# Patient Record
Sex: FEMALE | Race: WHITE | NOT HISPANIC OR LATINO | Employment: OTHER | URBAN - NONMETROPOLITAN AREA
[De-identification: names, ages, dates, MRNs, and addresses within clinical notes are randomized per-mention and may not be internally consistent; named-entity substitution may affect disease eponyms.]

---

## 2024-07-15 ENCOUNTER — OFFICE VISIT (OUTPATIENT)
Dept: URGENT CARE | Facility: CLINIC | Age: 69
End: 2024-07-15
Payer: MEDICARE

## 2024-07-15 VITALS
DIASTOLIC BLOOD PRESSURE: 80 MMHG | HEIGHT: 65 IN | OXYGEN SATURATION: 97 % | RESPIRATION RATE: 16 BRPM | HEART RATE: 77 BPM | SYSTOLIC BLOOD PRESSURE: 123 MMHG | BODY MASS INDEX: 32.49 KG/M2 | TEMPERATURE: 97.9 F | WEIGHT: 195 LBS

## 2024-07-15 DIAGNOSIS — J20.9 ACUTE BRONCHITIS, UNSPECIFIED ORGANISM: Primary | ICD-10-CM

## 2024-07-15 PROCEDURE — 99213 OFFICE O/P EST LOW 20 MIN: CPT | Performed by: NURSE PRACTITIONER

## 2024-07-15 RX ORDER — PREDNISONE 20 MG/1
20 TABLET ORAL DAILY
Qty: 5 TABLET | Refills: 0 | Status: SHIPPED | OUTPATIENT
Start: 2024-07-15 | End: 2024-07-20

## 2024-07-15 RX ORDER — LEVOTHYROXINE SODIUM 75 UG/1
TABLET ORAL
COMMUNITY

## 2024-07-15 RX ORDER — DOXYCYCLINE 100 MG/1
100 TABLET ORAL 2 TIMES DAILY
Qty: 14 TABLET | Refills: 0 | Status: SHIPPED | OUTPATIENT
Start: 2024-07-15 | End: 2024-07-22

## 2024-07-15 RX ORDER — PAROXETINE 10 MG/1
1 TABLET, FILM COATED ORAL
COMMUNITY
Start: 2023-12-31

## 2024-07-15 RX ORDER — HYDROXYZINE PAMOATE 25 MG/1
25 CAPSULE ORAL 3 TIMES DAILY PRN
COMMUNITY
Start: 2024-03-11

## 2024-07-15 NOTE — PROGRESS NOTES
69 y.o. female presents for evaluation of URI.  Symptoms including dry non productive cough, congestion, body aches, malaise, and headache have been present for 3 days and refractory to OTC meds.  No fever, chills, nausea, vomiting, abdominal pain, CP, or SOB.  Has a history of bronchitis. No known COVID 19/flu exposure.      Vitals:    07/15/24 0958   BP: 123/80   Pulse: 77   Resp: 16   Temp: 36.6 °C (97.9 °F)   SpO2: 97%       Allergies   Allergen Reactions    Azithromycin Rash    Penicillins Rash     Medication Documentation Review Audit       Reviewed by GAMAL Saleem (Nurse Practitioner) on 07/15/24 at 1007      Medication Order Taking? Sig Documenting Provider Last Dose Status   doxycycline (Adoxa) 100 mg tablet [id number]  Take 1 tablet (100 mg) by mouth 2 times a day for 7 days. Take with a full glass of water and do not lie down for at least 30 minutes after GAMAL Saleem  Active   hydrOXYzine pamoate (Vistaril) 25 mg capsule [id number] Yes Take 1 capsule (25 mg) by mouth 3 times a day as needed for anxiety. Historical Provider, MD  Active   levothyroxine (Synthroid, Levoxyl) 75 mcg tablet [id number]  Take by mouth. Historical Provider, MD  Active   PARoxetine (Paxil) 10 mg tablet [id number] Yes Take 1 tablet (10 mg) by mouth early in the morning.. Historical Provider, MD  Active   predniSONE (Deltasone) 20 mg tablet [id number]  Take 1 tablet (20 mg) by mouth once daily for 5 days. GAMAL Saleem  Active                    No past medical history on file.    No past surgical history on file.    ROS  See HPI    Physical Exam  Vitals and nursing note reviewed.   Constitutional:       General: She is not in acute distress.     Appearance: Normal appearance. She is not ill-appearing or toxic-appearing.   HENT:      Head: Normocephalic and atraumatic.      Right Ear: Tympanic membrane and ear canal normal.      Left Ear: Tympanic membrane and ear canal normal.      Nose:  Congestion present.      Mouth/Throat:      Mouth: Mucous membranes are moist.      Pharynx: Oropharynx is clear.   Eyes:      Extraocular Movements: Extraocular movements intact.      Conjunctiva/sclera: Conjunctivae normal.      Pupils: Pupils are equal, round, and reactive to light.   Cardiovascular:      Rate and Rhythm: Normal rate.   Pulmonary:      Effort: Pulmonary effort is normal.      Breath sounds: Normal breath sounds.      Comments: Dry cough during exam  Lymphadenopathy:      Cervical: Cervical adenopathy present.   Skin:     General: Skin is warm and dry.   Neurological:      General: No focal deficit present.      Mental Status: She is alert and oriented to person, place, and time.   Psychiatric:         Mood and Affect: Mood normal.         Behavior: Behavior normal.           Assessment/Plan/MDM  Chio was seen today for uri.  Diagnoses and all orders for this visit:  Acute bronchitis, unspecified organism (Primary)  -     doxycycline (Adoxa) 100 mg tablet; Take 1 tablet (100 mg) by mouth 2 times a day for 7 days. Take with a full glass of water and do not lie down for at least 30 minutes after  -     predniSONE (Deltasone) 20 mg tablet; Take 1 tablet (20 mg) by mouth once daily for 5 days.    Encouraged pt to continue otc cold remedies PRN, push PO fluids and rest. Patient's clinical presentation is otherwise unremarkable at this time. Patient is discharged with instructions to follow-up with primary care or seek emergency medical attention for worsening symptoms or any new concerns.    I did personally review Chio's past medical history, surgical history, social history, as well as family history (when relevant).  In this case, I also oversaw the her drug management by reviewing her medication list, allergy list, as well as the medications that I prescribed during the UC course and/or recommended as an out-patient (including possible OTC medications such as acetaminophen, NSAIDs ,  etc).    After reviewing the items above, I did not look at previous medical documentation, such as recent hospitalizations, office visits, and/or recent consultations with PCP/specialist.                          SDOH:   Another factor that I considered in Chio's care was her Social Determinants of Health (SDOH). During this UC encounter, she did not have social determinants of health. Those SDOH influencing Chio's care are: none      Kenneth Ndiaye CNP  [facility]  [phone]

## 2024-07-24 ENCOUNTER — EVALUATION (OUTPATIENT)
Dept: PHYSICAL THERAPY | Facility: CLINIC | Age: 69
End: 2024-07-24
Payer: MEDICARE

## 2024-07-24 DIAGNOSIS — M25.551 PAIN IN RIGHT HIP: Primary | ICD-10-CM

## 2024-07-24 PROCEDURE — 97110 THERAPEUTIC EXERCISES: CPT | Mod: GP | Performed by: PHYSICAL THERAPIST

## 2024-07-24 PROCEDURE — 97161 PT EVAL LOW COMPLEX 20 MIN: CPT | Mod: GP | Performed by: PHYSICAL THERAPIST

## 2024-07-24 ASSESSMENT — PATIENT HEALTH QUESTIONNAIRE - PHQ9
2. FEELING DOWN, DEPRESSED OR HOPELESS: NOT AT ALL
SUM OF ALL RESPONSES TO PHQ9 QUESTIONS 1 AND 2: 0
1. LITTLE INTEREST OR PLEASURE IN DOING THINGS: NOT AT ALL

## 2024-07-24 ASSESSMENT — ENCOUNTER SYMPTOMS
OCCASIONAL FEELINGS OF UNSTEADINESS: 0
DEPRESSION: 0
LOSS OF SENSATION IN FEET: 0

## 2024-07-24 ASSESSMENT — PAIN - FUNCTIONAL ASSESSMENT: PAIN_FUNCTIONAL_ASSESSMENT: 0-10

## 2024-07-24 ASSESSMENT — PAIN SCALES - GENERAL: PAINLEVEL_OUTOF10: 5 - MODERATE PAIN

## 2024-07-24 NOTE — LETTER
July 24, 2024    Rickey Roper MD  [address]  [place] [address]    Patient: Chio Thompson   YOB: 1955   Date of Visit: 7/24/2024       Dear Rickey Roper MD  [address] [place] [address]    The attached plan of care is being sent to you because your patient’s medical reimbursement requires that you certify the plan of care. Your signature is required to allow uninterrupted insurance coverage.      You may indicate your approval by signing below and faxing this form back to us at Dept Fax: [phone].    Please call Dept: [phone] with any questions or concerns.    Thank you for this referral,        Carlos Levy, PT  [address] [address]    Payer: Payor: MEDICARE / Plan: MEDICARE PART A AND B / Product Type: *No Product type* /                                                                         Date:     Dear Carlos Levy, PT,     Re: Ms. Chio Thompson, MRN:[id number]    I certify that I have reviewed the attached plan of care and it is medically necessary for Ms. Chio Thompson (1955) who is under my care.          ______________________________________                    _________________  Provider name and credentials                                           Date and time                                                                                           Plan of Care 7/24/24   Effective from: 7/24/2024  Effective to: 8/26/2024    Plan ID: [id number]            Participants as of Finalize on 7/24/2024    Name Type Comments Contact Info    Rickey Roper MD PCP - General  [phone]    Carlos Levy, PT Physical Therapist  [phone]       Last Plan Note     Author: Carlos Levy PT Status: Signed Last edited: 7/24/2024 10:00 AM       Physical Therapy Evaluation and Treatment      Patient Name: Chio Thompson  MRN: [id number]  Today's Date: 7/24/2024  Time  Calculation  Start Time: 1005  Stop Time: 1045  Time Calculation (min): 40 min    PT Evaluation Time Entry  PT Evaluation (Low) Time Entry: 29   PT Therapeutic Procedures Time Entry  Therapeutic Exercise Time Entry: 10                         Assessment:  PT Assessment Results: Decreased strength, Decreased range of motion, Impaired balance, Decreased mobility, Pain  Rehab Prognosis: Good  Barriers to Participation:  (None)    The pt presents with Medical Dx of Pain in R Hip.   Pt presents with the following deficits: increased pain, decreased strength, ROM, flexibility, balance, gait and functional mobility.  Pt would benefit from PT services in order to improve on these deficits and maximize strength and ability for functional activity/mobility.        Plan:  Treatment/Interventions: Aquatic therapy, Cryotherapy, Dry needling, Education/ Instruction, Electrical stimulation, Hot pack, Manual therapy, Neuromuscular re-education, Therapeutic exercises (Manual Therapy including IASTM as needed.)  PT Plan: Skilled PT  PT Frequency:  (2x/wk for 4 weeks or 9 sessions)  Rehab Potential: Good  Plan of Care Agreement: Patient (Patient Goal:  Improve pain to get around better.)    Current Problem:   Problem List Items Addressed This Visit             ICD-10-CM       Musculoskeletal and Injuries    Pain in right hip - Primary M25.551    Relevant Orders    Follow Up In Physical Therapy    Follow Up In Physical Therapy       Subjective   Pt has hx of L THR 6 years ago and reports that she started to have R hip area pain with leg buckling occasionally approx 2 months ago.  Pt reports that her pain is worse with getting up from a chair and negotiating stairs.  Pt reports that sitting, rest, tylenol and heat or ice helps with the pain.      General  Reason for Referral: R hip issue  Referred By: Dr. Roper  General Comment: Visit # 1/9    Precautions:  Precautions  Precautions Comment: Low Fall Risk.  PMH: OA, hx of L THR 6  yrs ago, thyrod disorder.    Pain:  Pain Assessment  Pain Assessment: 0-10  0-10 (Numeric) Pain Score: 5 - Moderate pain (R hip pain range  5-8/10)  Pain Type: Chronic pain  Pain Location: Hip  Pain Orientation: Right    Home Living:   Pt lives alone in a 2 story home.  2 flts of stairs with rail to upstairs and down to laundry in basement.    Prior Level of Function:   Pt was I with all ADL's and IADL's prior.  Retired.        Objective     Gait:  Mildly antalgic gait with mildly slower sadiq.      Palpation:   Tender R hip greater trochanter.     Strength:                      R-   Hip flex   4/5   Hip abd/add   4/5    Hip ER   4/5   Hip IR   4/5   Knee flex   4+/5               Knee ext   4+/5     L-   Hip flex   5/5   Hip abd/add   5/5    Hip ER   5/5   Hip IR   5/5   Knee flex   5/5               Knee ext   5/5    Hip PROM:                    R-   Hip flex  105 deg    Hip abd  35 deg    Hip ER 20  deg   Hip IR 10  deg     L-   WFL Throughout    Flexibility:   Moderate R HS and piriformis tightness.                                                                 Special Tests:  N/A    Outcome Measures:  Other Measures  Lower Extremity Funtional Score (LEFS): 43     Treatments:  Ther Ex:  10 min  1 unit  Hooklying SKTC  5 sec hold x 5 reps ea  Hooklying Piriformis/Glut Stretch  20 sec hold x 2 ea  Hooklying Lateral Trunk Rotation  (small comfortable arc of motion)   2 min  HEP instruction with handout given    OP EDUCATION:   PT edu pt regarding PT POC/course of treatment and HEP.  Pt was given exercise handout as a reference.  Pt verbalized good understanding.  Access Code: [email or web address]    Goals:  Active       PT Problem       PT Goal 1       Start:  24    Expected End:  24       LT) Improve L hip strength from 4/5  to >= 5-/5 throughout in order to facilitate safe gait and mobility.       4-6 weeks      2) Improve L hip PROM from 10 deg IR  to >= 20 deg IR  in order to facilitate safe gait and  stair negotiation.       4-6 weeks        3) Improve L hip pain from   8/10 to <= 0-3/10 with activity in order to improve QOL.   4-6 weeks      4) Improve LEFS score by >= 5 points in order to improve QOL.    4-6 weeks      5) Pt will be able to walk longer distances, transition sit to stand from chair, negotiate stairs and return to exercise, or work around the home without significant limitation.    4-6 weeks      ST) Pt/caregiver will be I and consistent with HEP with use of handout as needed in order to maximize strength and flexibility.      2-3 weeks                            Current Participants as of 2024    Name Type Comments Contact Info    Rickey Roper MD PCP - General  [phone]    Signature pending    Carlos Levy, PT Physical Therapist  [phone]

## 2024-07-24 NOTE — PROGRESS NOTES
Physical Therapy Evaluation and Treatment      Patient Name: Chio Thompson  MRN: [id number]  Today's Date: 7/24/2024  Time Calculation  Start Time: 1005  Stop Time: 1045  Time Calculation (min): 40 min    PT Evaluation Time Entry  PT Evaluation (Low) Time Entry: 29   PT Therapeutic Procedures Time Entry  Therapeutic Exercise Time Entry: 10                         Assessment:  PT Assessment Results: Decreased strength, Decreased range of motion, Impaired balance, Decreased mobility, Pain  Rehab Prognosis: Good  Barriers to Participation:  (None)    The pt presents with Medical Dx of Pain in R Hip.   Pt presents with the following deficits: increased pain, decreased strength, ROM, flexibility, balance, gait and functional mobility.  Pt would benefit from PT services in order to improve on these deficits and maximize strength and ability for functional activity/mobility.        Plan:  Treatment/Interventions: Aquatic therapy, Cryotherapy, Dry needling, Education/ Instruction, Electrical stimulation, Hot pack, Manual therapy, Neuromuscular re-education, Therapeutic exercises (Manual Therapy including IASTM as needed.)  PT Plan: Skilled PT  PT Frequency:  (2x/wk for 4 weeks or 9 sessions)  Rehab Potential: Good  Plan of Care Agreement: Patient (Patient Goal:  Improve pain to get around better.)    Current Problem:   Problem List Items Addressed This Visit             ICD-10-CM       Musculoskeletal and Injuries    Pain in right hip - Primary M25.551    Relevant Orders    Follow Up In Physical Therapy    Follow Up In Physical Therapy       Subjective   Pt has hx of L THR 6 years ago and reports that she started to have R hip area pain with leg buckling occasionally approx 2 months ago.  Pt reports that her pain is worse with getting up from a chair and negotiating stairs.  Pt reports that sitting, rest, tylenol and heat or ice helps with the pain.      General  Reason for Referral: R hip issue  Referred By:   [facility]  General Comment: Visit #     Precautions:  Precautions  Precautions Comment: Low Fall Risk.  PMH: OA, hx of L THR 6 yrs ago, thyrod disorder.    Pain:  Pain Assessment  Pain Assessment: 0-10  0-10 (Numeric) Pain Score: 5 - Moderate pain (R hip pain range  5-8/10)  Pain Type: Chronic pain  Pain Location: Hip  Pain Orientation: Right    Home Living:   Pt lives alone in a 2 story home.  2 flts of stairs with rail to upstairs and down to laundry in basement.    Prior Level of Function:   Pt was I with all ADL's and IADL's prior.  Retired.        Objective     Gait:  Mildly antalgic gait with mildly slower sadiq.      Palpation:   Tender R hip greater trochanter.     Strength:                      R-   Hip flex   4/5   Hip abd/add   4/5    Hip ER   4/5   Hip IR   4/5   Knee flex   4+/5               Knee ext   4+/5     L-   Hip flex   5/5   Hip abd/add   5/5    Hip ER   5/5   Hip IR   5/5   Knee flex   5/5               Knee ext   5/5    Hip PROM:                    R-   Hip flex  105 deg    Hip abd  35 deg    Hip ER 20  deg   Hip IR 10  deg     L-   WFL Throughout    Flexibility:   Moderate R HS and piriformis tightness.                                                                 Special Tests:  N/A    Outcome Measures:  Other Measures  Lower Extremity Funtional Score (LEFS): 43     Treatments:  Ther Ex:  10 min  1 unit  Hooklying SKTC  5 sec hold x 5 reps ea  Hooklying Piriformis/Glut Stretch  20 sec hold x 2 ea  Hooklying Lateral Trunk Rotation  (small comfortable arc of motion)   2 min  HEP instruction with handout given    OP EDUCATION:   PT edu pt regarding PT POC/course of treatment and HEP.  Pt was given exercise handout as a reference.  Pt verbalized good understanding.  Access Code: [email or web address]    Goals:  Active       PT Problem       PT Goal 1       Start:  24    Expected End:  24       LT) Improve L hip strength from 4/5  to >= 5-/5 throughout in order to facilitate safe  gait and mobility.       4-6 weeks      2) Improve L hip PROM from 10 deg IR  to >= 20 deg IR  in order to facilitate safe gait and stair negotiation.       4-6 weeks        3) Improve L hip pain from   8/10 to <= 0-3/10 with activity in order to improve QOL.   4-6 weeks      4) Improve LEFS score by >= 5 points in order to improve QOL.    4-6 weeks      5) Pt will be able to walk longer distances, transition sit to stand from chair, negotiate stairs and return to exercise, or work around the home without significant limitation.    4-6 weeks      ST) Pt/caregiver will be I and consistent with HEP with use of handout as needed in order to maximize strength and flexibility.      2-3 weeks

## 2024-07-26 ENCOUNTER — TREATMENT (OUTPATIENT)
Dept: PHYSICAL THERAPY | Facility: CLINIC | Age: 69
End: 2024-07-26
Payer: MEDICARE

## 2024-07-26 DIAGNOSIS — M25.551 PAIN IN RIGHT HIP: ICD-10-CM

## 2024-07-26 PROCEDURE — 97113 AQUATIC THERAPY/EXERCISES: CPT | Mod: GP,CQ

## 2024-07-26 ASSESSMENT — PAIN - FUNCTIONAL ASSESSMENT: PAIN_FUNCTIONAL_ASSESSMENT: 0-10

## 2024-07-26 ASSESSMENT — PAIN SCALES - GENERAL: PAINLEVEL_OUTOF10: 7

## 2024-07-26 NOTE — PROGRESS NOTES
Physical Therapy Treatment    Patient Name: Chio Thompson  MRN: [id number]  Today's Date: 2024  Time Calculation  Start Time: 920  Stop Time: 1005  Time Calculation (min): 45 min  PT Therapeutic Procedures Time Entry  Aquatic Therapy Time Entry: 44         Assessment:  Patient identified by name & .  Treatment consisted of aquatic therapy for core and Left LE/hip strengthening. Patient able to demo fair TrA isometric with requiring verbal cues to maintain during ex's. Instructed on use of rolling pin to massage R quads and on quad stretch for HEP. Handout given. Pain in right quads reduced to 4/10 post treatment.    Plan:  Progress ex's for increased hip strengthening for ease of sleeping at night and household tasks. -MA   OP PT Plan  Treatment/Interventions: Aquatic therapy, Cryotherapy, Dry needling, Education/ Instruction, Electrical stimulation, Hot pack, Manual therapy, Neuromuscular re-education, Therapeutic exercises (Manual Therapy including IASTM as needed.)  PT Plan: Skilled PT  PT Frequency:  (2x/wk for 4 weeks or 9 sessions)  Rehab Potential: Good  Plan of Care Agreement: Patient (Patient Goal:  Improve pain to get around better.)    Current Problem  Problem List Items Addressed This Visit             ICD-10-CM    Pain in right hip M25.551       Subjective   Patient compliant with HEP. Did not take any pain meds this morning. Pain in right quads. She is using a pillow between her knees at night and this helps when she rolls over in bed at night. Patient plans to [facility] to [facility] for their independent pool program after she is discharged from PT.     General  Reason for Referral: R hip issue  Referred By: Dr. Roper  General Comment: Visit # 2  Precautions  Precautions  Precautions Comment: Low Fall Risk.  PMH: OA, hx of L THR 6 yrs ago, thyrod disorder.    Pain  Pain Assessment: 0-10  0-10 (Numeric) Pain Score: 7  Pain Type: Chronic pain  Pain Location: Leg  Pain Orientation:  "Right    Objective:     Treatments:  Aquatic Therapy: x 44'   All ex's done in 70-75% unloading with TrA, unless noted  TrA isometric in 3 ft. 5\" x10 (N)  Side stepping x3 laps (N)   B heel raises x12 (N)   Hip flexion x10 ea LE (N)   Hip abd x10 ea LE (N)  March in place x10 (N)   Paddle ex's: open paddles x 15 each (N)  - push/pull, flex/ext, abd/add, H. Abd/add  100% unloading, 1 lg noodle (N)  - bike x4'   - abd/add x4'   - hang x4'   Gradual exit x3' (N)       Ther Ex: (X)  Hooklying SKTC  5 sec hold x 5 reps ea  Hooklying Piriformis/Glut Stretch  20 sec hold x 2 ea  Hooklying Lateral Trunk Rotation  (small comfortable arc of motion)   2 min  HEP instruction with handout given    OP EDUCATION:   PT edu pt regarding PT POC/course of treatment and HEP.  Pt was given exercise handout as a reference.  Pt verbalized good understanding.  Access Code: [email or web address]  24 Instructed on rolling pin to massage quadsAccess Code: [email or web address]  URL: [email or web address]  Date: 2024  Prepared by: Magalys Cash  Exercises  - Quadricep Stretch with Chair and Counter Support  - 1 x daily - 7 x weekly - 1 sets - 3 reps - 20 second hold      Active       PT Problem       PT Goal 1       Start:  24    Expected End:  24       LT) Improve L hip strength from 4/5  to >= 5-/5 throughout in order to facilitate safe gait and mobility.       4-6 weeks      2) Improve L hip PROM from 10 deg IR  to >= 20 deg IR  in order to facilitate safe gait and stair negotiation.       4-6 weeks        3) Improve L hip pain from   8/10 to <= 0-3/10 with activity in order to improve QOL.   4-6 weeks      4) Improve LEFS score by >= 5 points in order to improve QOL.    4-6 weeks      5) Pt will be able to walk longer distances, transition sit to stand from chair, negotiate stairs and return to exercise, or work around the home without significant limitation.    4-6 weeks      ST) Pt/caregiver will be I and " consistent with HEP with use of handout as needed in order to maximize strength and flexibility.      2-3 weeks

## 2024-07-31 ENCOUNTER — TREATMENT (OUTPATIENT)
Dept: PHYSICAL THERAPY | Facility: CLINIC | Age: 69
End: 2024-07-31
Payer: MEDICARE

## 2024-07-31 DIAGNOSIS — M25.551 PAIN IN RIGHT HIP: ICD-10-CM

## 2024-07-31 PROCEDURE — 97113 AQUATIC THERAPY/EXERCISES: CPT | Mod: GP,CQ | Performed by: PHYSICAL THERAPY ASSISTANT

## 2024-07-31 ASSESSMENT — PAIN SCALES - GENERAL: PAINLEVEL_OUTOF10: 3

## 2024-07-31 ASSESSMENT — PAIN - FUNCTIONAL ASSESSMENT: PAIN_FUNCTIONAL_ASSESSMENT: 0-10

## 2024-07-31 NOTE — PROGRESS NOTES
"Physical Therapy    Physical Therapy Treatment    Patient Name: Chio Thompson  MRN: [id number]  Today's Date: 2024  Time Calculation  Start Time: 0915  Stop Time: 1000  Time Calculation (min): 45 min  PT Therapeutic Procedures Time Entry  Aquatic Therapy Time Entry: 41                   Current Problem  Problem List Items Addressed This Visit             ICD-10-CM       Musculoskeletal and Injuries    Pain in right hip M25.551        General  Reason for Referral: R hip issue  Referred By: Dr. Roper  General Comment: Visit 3/9    Subjective   Patient reports no falls and states right hip pain of 3/10.    Precautions  Precautions  Precautions Comment: Low Fall Risk.  PMH: OA, hx of L THR 6 yrs ago, thyrod disorder.    Pain  Pain Assessment: 0-10  0-10 (Numeric) Pain Score: 3  Pain Type: Chronic pain  Pain Location: Leg  Pain Orientation: Right    Objective   All ex's done in 70-75% unloading with TrA, unless noted  TrA isometric in 3 ft. 5\" x10   Side stepping x3 laps   B heel raises x15    Hip flexion x15 ea LE   Hip abd x15 ea LE   March in place x15  Hip Circles  Cw/CCW x 15 reps  Paddle ex's: open paddles x 15 each   - push/pull, flex/ext, abd/add, H. Abd/add  100% unloading, 1 lg noodle   - bike x4'   - abd/add x4'   - hang x4'   Gradual exit x3'       Treatments:    OP Education      Assessment  Patient tolerated treatment without increased pain.  Patient has Fair TrA and keeps intact with there-ex.  Patient needing one UE support with LE there-ex and with 100% unloading.   Patient has good form/understanding with there-ex and keeps body in good alignment.  Added hip Circles to program with good tolerance.  Continue with LE strength/ROM to decrease hip pain, improve ambulation for ADL.  Patient verified by name and .    Plan  Continue with LE strength/ROM to improve sleep, standing, ambulation.  Treatment/Interventions: Aquatic therapy, Cryotherapy, Dry needling, Education/ Instruction, Electrical " stimulation, Hot pack, Manual therapy, Neuromuscular re-education, Therapeutic exercises (Manual Therapy including IASTM as needed.)  PT Plan: Skilled PT  PT Frequency:  (2x/wk for 4 weeks or 9 sessions)  Rehab Potential: Good  Plan of Care Agreement: Patient (Patient Goal:  Improve pain to get around better.)    Active       PT Problem       PT Goal 1       Start:  24    Expected End:  24       LT) Improve L hip strength from 4/5  to >= 5-/5 throughout in order to facilitate safe gait and mobility.       4-6 weeks      2) Improve L hip PROM from 10 deg IR  to >= 20 deg IR  in order to facilitate safe gait and stair negotiation.       4-6 weeks        3) Improve L hip pain from   8/10 to <= 0-3/10 with activity in order to improve QOL.   4-6 weeks      4) Improve LEFS score by >= 5 points in order to improve QOL.    4-6 weeks      5) Pt will be able to walk longer distances, transition sit to stand from chair, negotiate stairs and return to exercise, or work around the home without significant limitation.    4-6 weeks      ST) Pt/caregiver will be I and consistent with HEP with use of handout as needed in order to maximize strength and flexibility.      2-3 weeks

## 2024-08-02 ENCOUNTER — APPOINTMENT (OUTPATIENT)
Dept: PHYSICAL THERAPY | Facility: CLINIC | Age: 69
End: 2024-08-02
Payer: MEDICARE

## 2024-08-07 ENCOUNTER — TREATMENT (OUTPATIENT)
Dept: PHYSICAL THERAPY | Facility: CLINIC | Age: 69
End: 2024-08-07
Payer: MEDICARE

## 2024-08-07 DIAGNOSIS — M25.551 PAIN IN RIGHT HIP: ICD-10-CM

## 2024-08-07 PROCEDURE — 97113 AQUATIC THERAPY/EXERCISES: CPT | Mod: GP,CQ | Performed by: PHYSICAL THERAPY ASSISTANT

## 2024-08-07 ASSESSMENT — PAIN SCALES - GENERAL: PAINLEVEL_OUTOF10: 2

## 2024-08-07 ASSESSMENT — PAIN - FUNCTIONAL ASSESSMENT: PAIN_FUNCTIONAL_ASSESSMENT: 0-10

## 2024-08-07 NOTE — PROGRESS NOTES
"Physical Therapy    Physical Therapy Treatment    Patient Name: Chio Thompson  MRN: [id number]  Today's Date: 2024  Time Calculation  Start Time: 0915  Stop Time: 1000  Time Calculation (min): 45 min  PT Therapeutic Procedures Time Entry  Aquatic Therapy Time Entry: 43                   Current Problem  Problem List Items Addressed This Visit             ICD-10-CM       Musculoskeletal and Injuries    Pain in right hip M25.551        General  Reason for Referral: R hip issue  Referred By: Dr. Roper  General Comment: VISIT     Subjective     Patient reports no0 falls and states right hip pain of 2/10 today.  Precautions  Precautions  Precautions Comment: Low Fall Risk.  PMH: OA, hx of L THR 6 yrs ago, thyrod disorder.    Pain  Pain Assessment: 0-10  0-10 (Numeric) Pain Score: 2  Pain Type: Chronic pain  Pain Location: Leg  Pain Orientation: Right    Objective   All ex's done in 70-75% unloading with TrA, unless noted  TrA isometric in 3 ft. 5\" x10   Side stepping x3 laps   B heel raises x15    Hip flexion x15 ea LE   Hip abd x15 ea LE   March in place x15  Standing hamcurls x 15  Hip Circles  Cw/CCW x 15 reps  Paddle ex's: open paddles x 15 each   - push/pull, flex/ext, abd/add, H. Abd/add  100% unloading, 1 lg noodle   - bike x4'   - abd/add x4'   - hang x4'   Gradual exit x3'       Treatments:    Treatments:    OP Education      Assessment  patient tolerated treatment without increased hip pain.  Patient has Fair TrA and keeps intact with there-ex.  Patient has good form/understanding with there-ex and keeps body in good alignment.  Patient needing one UE support with LE there-ex and no support with 100% unloading.  Continue with LE strength/ROM to decrease hip pain, improve ambulation to decrease fall risk.  Patient verified by name and .    Plan  Continue with right hip strength/ROM to improve sleep, standing, ambulation.  Treatment/Interventions: Aquatic therapy, Cryotherapy, Dry needling, " Education/ Instruction, Electrical stimulation, Hot pack, Manual therapy, Neuromuscular re-education, Therapeutic exercises (Manual Therapy including IASTM as needed.)  PT Plan: Skilled PT  PT Frequency:  (2x/wk for 4 weeks or 9 sessions)  Rehab Potential: Good  Plan of Care Agreement: Patient (Patient Goal:  Improve pain to get around better.)    Active       PT Problem       PT Goal 1       Start:  24    Expected End:  24       LT) Improve L hip strength from 4/5  to >= 5-/5 throughout in order to facilitate safe gait and mobility.       4-6 weeks      2) Improve L hip PROM from 10 deg IR  to >= 20 deg IR  in order to facilitate safe gait and stair negotiation.       4-6 weeks        3) Improve L hip pain from   8/10 to <= 0-3/10 with activity in order to improve QOL.   4-6 weeks      4) Improve LEFS score by >= 5 points in order to improve QOL.    4-6 weeks      5) Pt will be able to walk longer distances, transition sit to stand from chair, negotiate stairs and return to exercise, or work around the home without significant limitation.    4-6 weeks      ST) Pt/caregiver will be I and consistent with HEP with use of handout as needed in order to maximize strength and flexibility.      2-3 weeks

## 2024-08-09 ENCOUNTER — TREATMENT (OUTPATIENT)
Dept: PHYSICAL THERAPY | Facility: CLINIC | Age: 69
End: 2024-08-09
Payer: MEDICARE

## 2024-08-09 DIAGNOSIS — M25.551 PAIN IN RIGHT HIP: ICD-10-CM

## 2024-08-09 PROCEDURE — 97113 AQUATIC THERAPY/EXERCISES: CPT | Mod: GP,CQ | Performed by: PHYSICAL THERAPY ASSISTANT

## 2024-08-09 ASSESSMENT — PAIN SCALES - GENERAL: PAINLEVEL_OUTOF10: 3

## 2024-08-09 ASSESSMENT — PAIN - FUNCTIONAL ASSESSMENT: PAIN_FUNCTIONAL_ASSESSMENT: 0-10

## 2024-08-09 NOTE — PROGRESS NOTES
"Physical Therapy    Physical Therapy Treatment    Patient Name: Chio Thompson  MRN: [id number]  Today's Date: 2024  Time Calculation  Start Time: 0915  Stop Time: 1000  Time Calculation (min): 45 min  PT Therapeutic Procedures Time Entry  Aquatic Therapy Time Entry: 43                   Current Problem  Problem List Items Addressed This Visit             ICD-10-CM       Musculoskeletal and Injuries    Pain in right hip M25.551        General  Reason for Referral: R hip issue  Referred By: Dr. Roper  General Comment: VISIT     Subjective  Patient reports no falls and states 3/10 right hip pain.    Precautions  Precautions  Precautions Comment: Low Fall Risk.  PMH: OA, hx of L THR 6 yrs ago, thyrod disorder.    Pain  Pain Assessment: 0-10  0-10 (Numeric) Pain Score: 3  Pain Type: Chronic pain  Pain Location: Leg  Pain Orientation: Right    Objective   All ex's done in 70-75% unloading with TrA, unless noted  TrA isometric in 3 ft. 5\" x10   Side stepping x3 laps   B heel raises x15    Hip flexion x15 ea LE   Hip abd x15 ea LE   March in place x15  Standing hamcurls x 15  Hip Circles  Cw/CCW x 15 reps  Paddle ex's: open paddles x 15 each   - push/pull, flex/ext, abd/add, H. Abd/add  100% unloading, 1 lg noodle   - bike x4'   - abd/add x4'   - hang x4'   Gradual exit x3'       Treatments:    OP Education      Assessment Patient tolerated treatment without increased pain.  Patient states pool has really been helping.  Patient needing one UE support with LE there-ex and no support with 100% unloading.  Patient has good form/understanding with there-ex and keeps body in good alignment.  Continue with right LE strength/ROM to decrease hip pain, improve ambulation to decrease fall risk.  Patient verified by name and .    Plan  Continue with right hip strength/ROM to improve sleeping, standing, ambulation.  Treatment/Interventions: Aquatic therapy, Cryotherapy, Dry needling, Education/ Instruction, Electrical " stimulation, Hot pack, Manual therapy, Neuromuscular re-education, Therapeutic exercises (Manual Therapy including IASTM as needed.)  PT Plan: Skilled PT  PT Frequency:  (2x/wk for 4 weeks or 9 sessions)  Rehab Potential: Good  Plan of Care Agreement: Patient (Patient Goal:  Improve pain to get around better.)    Active       PT Problem       PT Goal 1       Start:  24    Expected End:  24       LT) Improve L hip strength from 4/5  to >= 5-/5 throughout in order to facilitate safe gait and mobility.       4-6 weeks      2) Improve L hip PROM from 10 deg IR  to >= 20 deg IR  in order to facilitate safe gait and stair negotiation.       4-6 weeks        3) Improve L hip pain from   8/10 to <= 0-3/10 with activity in order to improve QOL.   4-6 weeks      4) Improve LEFS score by >= 5 points in order to improve QOL.    4-6 weeks      5) Pt will be able to walk longer distances, transition sit to stand from chair, negotiate stairs and return to exercise, or work around the home without significant limitation.    4-6 weeks      ST) Pt/caregiver will be I and consistent with HEP with use of handout as needed in order to maximize strength and flexibility.      2-3 weeks

## 2024-08-13 ENCOUNTER — TREATMENT (OUTPATIENT)
Dept: PHYSICAL THERAPY | Facility: CLINIC | Age: 69
End: 2024-08-13
Payer: MEDICARE

## 2024-08-13 DIAGNOSIS — M25.551 PAIN IN RIGHT HIP: ICD-10-CM

## 2024-08-13 PROCEDURE — 97113 AQUATIC THERAPY/EXERCISES: CPT | Mod: GP,CQ

## 2024-08-13 ASSESSMENT — PAIN - FUNCTIONAL ASSESSMENT: PAIN_FUNCTIONAL_ASSESSMENT: 0-10

## 2024-08-13 ASSESSMENT — PAIN SCALES - GENERAL: PAINLEVEL_OUTOF10: 5 - MODERATE PAIN

## 2024-08-13 NOTE — PROGRESS NOTES
Physical Therapy Treatment    Patient Name: Chio Thompson  MRN: [id number]  Today's Date: 8/13/2024  Time Calculation  Start Time: 1000  Stop Time: 1044  Time Calculation (min): 44 min    PT Therapeutic Procedures Time Entry  Aquatic Therapy Time Entry: 43       Assessment: Patient identified by name and date of birth. Did not increase reps today secondary to higher pain levels, focused on good form and core control. Patient able to progress to SLS today with B/L support on noodle, patient challenged with this activity. Cues to keep hip abd in small range of motion to avoid increased pain.    PT Assessment  PT Assessment Results: Decreased strength, Decreased range of motion, Impaired balance, Decreased mobility, Pain  Rehab Prognosis: Good  Barriers to Discharge: None    Plan: Plan to continue with core/LE strengthening to allow for ease of function with ambulation and transfers.       OP PT Plan  Treatment/Interventions: Aquatic therapy, Cryotherapy, Dry needling, Education/ Instruction, Electrical stimulation, Hot pack, Manual therapy, Neuromuscular re-education, Therapeutic exercises (Manual Therapy including IASTM as needed.)  PT Plan: Skilled PT  PT Frequency:  (2x/wk for 4 weeks or 9 sessions)  Rehab Potential: Good  Plan of Care Agreement: Patient (Patient Goal:  Improve pain to get around better.)      Subjective  Patient reports 100% compliance with HEP and expressed good understanding. Patient states that she has increased R hip pain today, 5/10. States that she has been going to [facility] doing the same pool exercises.          Precautions  Precautions  Precautions Comment: Low Fall Risk.  PMH: OA, hx of L THR 6 yrs ago, thyrod disorder.  Pain  Pain Assessment: 0-10  0-10 (Numeric) Pain Score: 5 - Moderate pain  Pain Type: Chronic pain  Pain Location: Leg  Pain Orientation: Right        Current Problem  1. Pain in right hip  Follow Up In Physical Therapy          Reason for Referral: R hip  "issue  Referred By: Dr. Roper  General Comment: VISIT   Objective: Good form with squats, able to keep weight in heels    Treatments:     All ex's done in 70-75% unloading with TrA, unless noted  TrA isometric in 3 ft. 5\" x10   Side stepping x3 laps   B heel raises x15    Hip flexion x15 ea LE   Hip abd x15 ea LE   March in place x15  Standing hamcurls x 15  SLS 3 x 30\" N  Squat x10 N  Hip Circles  Cw/CCW x 15 reps  Paddle ex's: open paddles x 15 each   - push/pull, flex/ext, abd/add, H. Abd/add  100% unloading, 1 lg noodle   - bike x5'   - abd/add x5'   - hang x5'   Gradual exit x3'   OP EDUCATION:       Access Code: [email or web address]  24 Instructed on rolling pin to massage quadsAccess Code: [email or web address]  URL: [email or web address]  Date: 2024  Prepared by: Magalys Cash  Exercises  - Quadricep Stretch with Chair and Counter Support  - 1 x daily - 7 x weekly - 1 sets - 3 reps - 20 second hold  Goals:  Active       PT Problem       PT Goal 1       Start:  24    Expected End:  24       LT) Improve L hip strength from 4/5  to >= 5-/5 throughout in order to facilitate safe gait and mobility.       4-6 weeks      2) Improve L hip PROM from 10 deg IR  to >= 20 deg IR  in order to facilitate safe gait and stair negotiation.       4-6 weeks        3) Improve L hip pain from   8/10 to <= 0-3/10 with activity in order to improve QOL.   4-6 weeks      4) Improve LEFS score by >= 5 points in order to improve QOL.    4-6 weeks      5) Pt will be able to walk longer distances, transition sit to stand from chair, negotiate stairs and return to exercise, or work around the home without significant limitation.    4-6 weeks      ST) Pt/caregiver will be I and consistent with HEP with use of handout as needed in order to maximize strength and flexibility.      2-3 weeks              "

## 2024-08-16 ENCOUNTER — APPOINTMENT (OUTPATIENT)
Dept: PHYSICAL THERAPY | Facility: CLINIC | Age: 69
End: 2024-08-16
Payer: MEDICARE

## 2024-08-19 ENCOUNTER — TREATMENT (OUTPATIENT)
Dept: PHYSICAL THERAPY | Facility: CLINIC | Age: 69
End: 2024-08-19
Payer: MEDICARE

## 2024-08-19 DIAGNOSIS — M25.551 PAIN IN RIGHT HIP: Primary | ICD-10-CM

## 2024-08-19 PROCEDURE — 97113 AQUATIC THERAPY/EXERCISES: CPT | Mod: GP,CQ | Performed by: PHYSICAL THERAPY ASSISTANT

## 2024-08-19 ASSESSMENT — PAIN SCALES - GENERAL: PAINLEVEL_OUTOF10: 5 - MODERATE PAIN

## 2024-08-19 ASSESSMENT — PAIN - FUNCTIONAL ASSESSMENT: PAIN_FUNCTIONAL_ASSESSMENT: 0-10

## 2024-08-19 NOTE — PROGRESS NOTES
"Physical Therapy    Physical Therapy Treatment    Patient Name: Chio Thompson  MRN: [id number]  Today's Date: 2024  Time Calculation  Start Time: 1430  Stop Time: 1515  Time Calculation (min): 45 min  PT Therapeutic Procedures Time Entry  Aquatic Therapy Time Entry: 41                   Current Problem  Problem List Items Addressed This Visit             ICD-10-CM       Musculoskeletal and Injuries    Pain in right hip - Primary M25.551        General  Reason for Referral: R hip issue  Referred By: Dr. Roper  General Comment: VISIT     Subjective   Patient reports no falls and states right hip pain of 5/10 today.    Precautions  Precautions  Precautions Comment: Low Fall Risk.  PMH: OA, hx of L THR 6 yrs ago, thyrod disorder.    Pain  Pain Assessment: 0-10  0-10 (Numeric) Pain Score: 5 - Moderate pain  Pain Type: Chronic pain  Pain Location: Leg  Pain Orientation: Right    Objective   All ex's done in 70-75% unloading with TrA, unless noted  TrA isometric in 3 ft. 5\" x10   Side stepping x3 laps   B heel raises x15    Hip flexion x15 ea LE   Hip abd x15 ea LE   March in place x15  Standing hamcurls x 15  SLS 3 x 30\"   Squat x10   Hip Circles  Cw/CCW x 15 reps  Paddle ex's: open paddles x 15 each   - push/pull, flex/ext, abd/add, H. Abd/add  100% unloading, 1 lg noodle   - bike x5'   - abd/add x5'   - hang x5'   GRADUAL EXIT 3'    Treatments:    OP Education      Assessment  Patient tolerated treatment without increased hip pain. Patient has Fair TrA and keeps intact with there-ex.  Patient needing no UE support with LE there-ex or with 100% unloading.   Patient has good form/understanding with there-ex and keeps body in good alignment. Continue with LE strength/ROM to decrease hip pain, improve ambulation, standing, ADL.  Patient verified by name and .    Plan  Continue with LE strength/ROM to improve sleep, standing, ambulation/steps.  Treatment/Interventions: Aquatic therapy, Cryotherapy, Dry " needling, Education/ Instruction, Electrical stimulation, Hot pack, Manual therapy, Neuromuscular re-education, Therapeutic exercises (Manual Therapy including IASTM as needed.)  PT Plan: Skilled PT  PT Frequency:  (2x/wk for 4 weeks or 9 sessions)  Rehab Potential: Good  Plan of Care Agreement: Patient (Patient Goal:  Improve pain to get around better.)    Active       PT Problem       PT Goal 1       Start:  24    Expected End:  24       LT) Improve L hip strength from 4/5  to >= 5-/5 throughout in order to facilitate safe gait and mobility.       4-6 weeks      2) Improve L hip PROM from 10 deg IR  to >= 20 deg IR  in order to facilitate safe gait and stair negotiation.       4-6 weeks        3) Improve L hip pain from   8/10 to <= 0-3/10 with activity in order to improve QOL.   4-6 weeks      4) Improve LEFS score by >= 5 points in order to improve QOL.    4-6 weeks      5) Pt will be able to walk longer distances, transition sit to stand from chair, negotiate stairs and return to exercise, or work around the home without significant limitation.    4-6 weeks      ST) Pt/caregiver will be I and consistent with HEP with use of handout as needed in order to maximize strength and flexibility.      2-3 weeks

## 2024-08-21 ENCOUNTER — TREATMENT (OUTPATIENT)
Dept: PHYSICAL THERAPY | Facility: CLINIC | Age: 69
End: 2024-08-21
Payer: MEDICARE

## 2024-08-21 DIAGNOSIS — M25.551 PAIN IN RIGHT HIP: ICD-10-CM

## 2024-08-21 PROCEDURE — 97110 THERAPEUTIC EXERCISES: CPT | Mod: GP | Performed by: PHYSICAL THERAPIST

## 2024-08-21 ASSESSMENT — PAIN - FUNCTIONAL ASSESSMENT: PAIN_FUNCTIONAL_ASSESSMENT: 0-10

## 2024-08-21 ASSESSMENT — PAIN SCALES - GENERAL: PAINLEVEL_OUTOF10: 8

## 2024-08-21 NOTE — PROGRESS NOTES
Physical Therapy Discharge/Treatment    Patient Name: Chio Thompson  MRN: [id number]  Today's Date: 8/21/2024  Time Calculation  Start Time: 0915  Stop Time: 0942  Time Calculation (min): 27 min      PT Therapeutic Procedures Time Entry  Therapeutic Exercise Time Entry: 25                         General  Reason for Referral: R hip issue  Referred By: Dr. Roper  General Comment: VISIT 8    Assessment:  PT Assessment  PT Assessment Results: Decreased strength, Decreased range of motion, Impaired balance, Decreased mobility, Pain  Rehab Prognosis: Good  Barriers to Discharge: NonePt had good overall tolerance to exercises performed in treatment today.  Pt was challenged with ex's performed.  Good form with ex's completed with minimal cueing needed.    8/21/2024 PT DC Summary:  The pt has made good objective progress in PT with improved  L Hip proprioception, strength and ROM/flexibility.  The pt has MET or PARTIALLY MET most of her PT goals and is I with current HEP.  DC PT at this time.  Follow up with MD for further diagnostic testing or other treatment options as needed.        Plan:  OP PT Plan  Treatment/Interventions: Aquatic therapy, Cryotherapy, Dry needling, Education/ Instruction, Electrical stimulation, Hot pack, Manual therapy, Neuromuscular re-education, Therapeutic exercises (Manual Therapy including IASTM as needed.)  PT Plan: Skilled PT  PT Frequency:  (No Further Sessions.  DC.)  Rehab Potential: Good  Plan of Care Agreement: Patient (Patient Goal:  Improve pain to get around better.)    Current Problem  Problem List Items Addressed This Visit             ICD-10-CM       Musculoskeletal and Injuries    Pain in right hip M25.551       Subjective  Pt reports that aquatic rx has helped manage her L hip pain and that it is doing better overall but still present at times and worse at night.  Pt reports that she will continue with her aquatic and land based stretches for HEP I moving forward.   "    Precautions  Precautions  Precautions Comment: Low Fall Risk.  PMH: OA, hx of L THR 6 yrs ago, thyrod disorder.    Pain  Pain Assessment: 0-10  0-10 (Numeric) Pain Score: 8  Pain Type: Chronic pain  Pain Location: Hip  Pain Orientation: Left      Treatments:  Ther Ex:  25 min  2 unit  Hooklying SKTC  5 sec hold x 5 reps ea  Hooklying Piriformis/Glut Stretch  20 sec hold x 2 ea  Hooklying Lateral Trunk Rotation  (small comfortable arc of motion)   2 min  2024 PT Discharge Completed Today.      Following Ex's X Today:  All ex's done in 70-75% unloading with TrA, unless noted  TrA isometric in 3 ft. 5\" x10   Side stepping x3 laps   B heel raises x15    Hip flexion x15 ea LE   Hip abd x15 ea LE   March in place x15  Standing hamcurls x 15  SLS 3 x 30\"   Squat x10   Hip Circles  Cw/CCW x 15 reps  Paddle ex's: open paddles x 15 each   - push/pull, flex/ext, abd/add, H. Abd/add  100% unloading, 1 lg noodle   - bike x5'   - abd/add x5'   - hang x5'   GRADUAL EXIT 3'    OP EDUCATION:   Edu on proper form and speed of movement with ex's.  Pt verbalized/demonstrated good understanding.  Access Code: [email or web address]       Goals:  Active       PT Problem       PT Goal 1       Start:  24    Expected End:  24       LT) Improve L hip strength from 4/5  to >= 5-/5 throughout in order to facilitate safe gait and mobility.       4-6 weeks  2024, MET, L hip strength 5-/5 throughout    2) Improve L hip PROM from 10 deg IR  to >= 20 deg IR  in order to facilitate safe gait and stair negotiation.       4-6 weeks    2024, MET, L hip PROM 25 deg IR at DC    3) Improve L hip pain from   8/10 to <= 0-3/10 with activity in order to improve QOL.   4-6 weeks  2024, PARTIALLY MET, L hip area/quad pain range 3-6/10 with activity    4) Improve LEFS score by >= 5 points in order to improve QOL.    4-6 weeks  2024, PARTIALLY MET, Pt scored a 43 at baseline and a 44 at DC    5) Pt will be able to walk longer " distances, transition sit to stand from chair, negotiate stairs and return to exercise, or work around the home without significant limitation.    4-6 weeks  2024, MET, pt is able to do the above activity better without significant pain/limitation.     ST) Pt/caregiver will be I and consistent with HEP with use of handout as needed in order to maximize strength and flexibility.      2-3 weeks  2024, MET, pt is I with HEP and has handouts as a reference.

## 2024-09-06 ENCOUNTER — APPOINTMENT (OUTPATIENT)
Dept: RADIOLOGY | Facility: HOSPITAL | Age: 69
End: 2024-09-06
Payer: MEDICARE

## 2024-09-06 ENCOUNTER — HOSPITAL ENCOUNTER (EMERGENCY)
Facility: HOSPITAL | Age: 69
Discharge: HOME | End: 2024-09-06
Attending: EMERGENCY MEDICINE
Payer: MEDICARE

## 2024-09-06 ENCOUNTER — APPOINTMENT (OUTPATIENT)
Dept: CARDIOLOGY | Facility: HOSPITAL | Age: 69
End: 2024-09-06
Payer: MEDICARE

## 2024-09-06 VITALS
HEART RATE: 74 BPM | RESPIRATION RATE: 16 BRPM | TEMPERATURE: 97.8 F | SYSTOLIC BLOOD PRESSURE: 152 MMHG | OXYGEN SATURATION: 95 % | DIASTOLIC BLOOD PRESSURE: 86 MMHG | BODY MASS INDEX: 33.32 KG/M2 | HEIGHT: 65 IN | WEIGHT: 200 LBS

## 2024-09-06 DIAGNOSIS — R11.0 NAUSEA: Primary | ICD-10-CM

## 2024-09-06 DIAGNOSIS — F41.9 ANXIETY: ICD-10-CM

## 2024-09-06 LAB
ALBUMIN SERPL BCP-MCNC: 4.4 G/DL (ref 3.4–5)
ALP SERPL-CCNC: 88 U/L (ref 33–136)
ALT SERPL W P-5'-P-CCNC: 23 U/L (ref 7–45)
ANION GAP SERPL CALC-SCNC: 12 MMOL/L (ref 10–20)
AST SERPL W P-5'-P-CCNC: 21 U/L (ref 9–39)
BASOPHILS # BLD AUTO: 0.07 X10*3/UL (ref 0–0.1)
BASOPHILS NFR BLD AUTO: 1 %
BILIRUB SERPL-MCNC: 0.6 MG/DL (ref 0–1.2)
BUN SERPL-MCNC: 13 MG/DL (ref 6–23)
CALCIUM SERPL-MCNC: 9.5 MG/DL (ref 8.6–10.3)
CARDIAC TROPONIN I PNL SERPL HS: 3 NG/L (ref 0–13)
CARDIAC TROPONIN I PNL SERPL HS: 4 NG/L (ref 0–13)
CHLORIDE SERPL-SCNC: 107 MMOL/L (ref 98–107)
CO2 SERPL-SCNC: 23 MMOL/L (ref 21–32)
CREAT SERPL-MCNC: 0.94 MG/DL (ref 0.5–1.05)
EGFRCR SERPLBLD CKD-EPI 2021: 65 ML/MIN/1.73M*2
EOSINOPHIL # BLD AUTO: 0.11 X10*3/UL (ref 0–0.4)
EOSINOPHIL NFR BLD AUTO: 1.5 %
ERYTHROCYTE [DISTWIDTH] IN BLOOD BY AUTOMATED COUNT: 12.5 % (ref 11.5–14.5)
GLUCOSE SERPL-MCNC: 129 MG/DL (ref 74–99)
HCT VFR BLD AUTO: 44.6 % (ref 36–46)
HGB BLD-MCNC: 14.9 G/DL (ref 12–16)
IMM GRANULOCYTES # BLD AUTO: 0.03 X10*3/UL (ref 0–0.5)
IMM GRANULOCYTES NFR BLD AUTO: 0.4 % (ref 0–0.9)
LIPASE SERPL-CCNC: 70 U/L (ref 9–82)
LYMPHOCYTES # BLD AUTO: 1.48 X10*3/UL (ref 0.8–3)
LYMPHOCYTES NFR BLD AUTO: 20.4 %
MCH RBC QN AUTO: 30.1 PG (ref 26–34)
MCHC RBC AUTO-ENTMCNC: 33.4 G/DL (ref 32–36)
MCV RBC AUTO: 90 FL (ref 80–100)
MONOCYTES # BLD AUTO: 0.54 X10*3/UL (ref 0.05–0.8)
MONOCYTES NFR BLD AUTO: 7.4 %
NEUTROPHILS # BLD AUTO: 5.02 X10*3/UL (ref 1.6–5.5)
NEUTROPHILS NFR BLD AUTO: 69.3 %
NRBC BLD-RTO: 0 /100 WBCS (ref 0–0)
PLATELET # BLD AUTO: 209 X10*3/UL (ref 150–450)
POTASSIUM SERPL-SCNC: 3.4 MMOL/L (ref 3.5–5.3)
PROT SERPL-MCNC: 7.1 G/DL (ref 6.4–8.2)
RBC # BLD AUTO: 4.95 X10*6/UL (ref 4–5.2)
SODIUM SERPL-SCNC: 139 MMOL/L (ref 136–145)
WBC # BLD AUTO: 7.3 X10*3/UL (ref 4.4–11.3)

## 2024-09-06 PROCEDURE — 36415 COLL VENOUS BLD VENIPUNCTURE: CPT | Performed by: EMERGENCY MEDICINE

## 2024-09-06 PROCEDURE — 71045 X-RAY EXAM CHEST 1 VIEW: CPT | Performed by: RADIOLOGY

## 2024-09-06 PROCEDURE — 96361 HYDRATE IV INFUSION ADD-ON: CPT

## 2024-09-06 PROCEDURE — 84484 ASSAY OF TROPONIN QUANT: CPT | Performed by: EMERGENCY MEDICINE

## 2024-09-06 PROCEDURE — 71045 X-RAY EXAM CHEST 1 VIEW: CPT

## 2024-09-06 PROCEDURE — 93005 ELECTROCARDIOGRAM TRACING: CPT

## 2024-09-06 PROCEDURE — 83690 ASSAY OF LIPASE: CPT | Performed by: EMERGENCY MEDICINE

## 2024-09-06 PROCEDURE — 99284 EMERGENCY DEPT VISIT MOD MDM: CPT | Mod: 25

## 2024-09-06 PROCEDURE — 96374 THER/PROPH/DIAG INJ IV PUSH: CPT

## 2024-09-06 PROCEDURE — 96375 TX/PRO/DX INJ NEW DRUG ADDON: CPT

## 2024-09-06 PROCEDURE — 80053 COMPREHEN METABOLIC PANEL: CPT | Performed by: EMERGENCY MEDICINE

## 2024-09-06 PROCEDURE — 2500000004 HC RX 250 GENERAL PHARMACY W/ HCPCS (ALT 636 FOR OP/ED): Performed by: EMERGENCY MEDICINE

## 2024-09-06 PROCEDURE — 85025 COMPLETE CBC W/AUTO DIFF WBC: CPT | Performed by: EMERGENCY MEDICINE

## 2024-09-06 RX ORDER — LORAZEPAM 2 MG/ML
0.5 INJECTION INTRAMUSCULAR ONCE
Status: COMPLETED | OUTPATIENT
Start: 2024-09-06 | End: 2024-09-06

## 2024-09-06 RX ORDER — ONDANSETRON 4 MG/1
4 TABLET, ORALLY DISINTEGRATING ORAL EVERY 8 HOURS PRN
Qty: 10 TABLET | Refills: 0 | Status: SHIPPED | OUTPATIENT
Start: 2024-09-06 | End: 2024-09-09

## 2024-09-06 RX ORDER — ONDANSETRON HYDROCHLORIDE 2 MG/ML
4 INJECTION, SOLUTION INTRAVENOUS ONCE
Status: COMPLETED | OUTPATIENT
Start: 2024-09-06 | End: 2024-09-06

## 2024-09-06 ASSESSMENT — PAIN SCALES - GENERAL: PAINLEVEL_OUTOF10: 0 - NO PAIN

## 2024-09-06 ASSESSMENT — PAIN - FUNCTIONAL ASSESSMENT: PAIN_FUNCTIONAL_ASSESSMENT: 0-10

## 2024-09-06 ASSESSMENT — COLUMBIA-SUICIDE SEVERITY RATING SCALE - C-SSRS
1. IN THE PAST MONTH, HAVE YOU WISHED YOU WERE DEAD OR WISHED YOU COULD GO TO SLEEP AND NOT WAKE UP?: NO
2. HAVE YOU ACTUALLY HAD ANY THOUGHTS OF KILLING YOURSELF?: NO
6. HAVE YOU EVER DONE ANYTHING, STARTED TO DO ANYTHING, OR PREPARED TO DO ANYTHING TO END YOUR LIFE?: NO

## 2024-09-06 NOTE — ED PROVIDER NOTES
"HPI   Chief Complaint   Patient presents with    Nausea     Pt states she has been feeling \"sweaty and nauseous\" since this morning. Denies chest pain or SOB. States under increased stress the past couple days. Took vistaril       Patient presents to the emergency department secondary to anxiety, nausea, and feeling sweaty.  States that her symptoms are usually present when she is at work.  Reports increasing stress.  Denies chest pain or shortness of breath.      History provided by:  Patient   used: No            Patient History   Past Medical History:   Diagnosis Date    Disease of thyroid gland      History reviewed. No pertinent surgical history.  No family history on file.  Social History     Tobacco Use    Smoking status: Never    Smokeless tobacco: Never   Substance Use Topics    Alcohol use: Never    Drug use: Never       Physical Exam   ED Triage Vitals [09/06/24 1420]   Temperature Heart Rate Respirations BP   36.6 °C (97.8 °F) (!) 107 18 171/86      Pulse Ox Temp Source Heart Rate Source Patient Position   99 % Temporal -- --      BP Location FiO2 (%)     -- --       Physical Exam  Vitals and nursing note reviewed.   Constitutional:       General: She is not in acute distress.     Appearance: Normal appearance. She is normal weight. She is not ill-appearing, toxic-appearing or diaphoretic.      Comments: Appears a bit apprehensive.  Not diaphoretic.  Smiling.  Answers questions appropriately.   HENT:      Head: Normocephalic and atraumatic.      Nose: Nose normal. No rhinorrhea.   Neck:      Comments: Trachea is midline  Cardiovascular:      Rate and Rhythm: Normal rate and regular rhythm.      Heart sounds: No murmur heard.  Pulmonary:      Effort: Pulmonary effort is normal.      Breath sounds: Normal breath sounds. No wheezing.   Abdominal:      General: Abdomen is flat. Bowel sounds are normal. There is no distension.      Palpations: Abdomen is soft.      Tenderness: There is no " abdominal tenderness.   Musculoskeletal:         General: Normal range of motion.      Cervical back: Normal range of motion.   Skin:     General: Skin is warm and dry.      Findings: No rash.   Neurological:      General: No focal deficit present.      Mental Status: She is alert and oriented to person, place, and time. Mental status is at baseline.   Psychiatric:         Mood and Affect: Mood normal.         Behavior: Behavior normal.         Thought Content: Thought content normal.         Judgment: Judgment normal.           ED Course & MDM   Diagnoses as of 09/06/24 1638   Nausea   Anxiety                 No data recorded                                 Medical Decision Making  Twelve-lead EKG was interpreted by myself and this was noted to contribute directly to patient care.  Study reveals a sinus tachycardia 102 bpm, normal axis, early R wave progression, left bundle branch block, no acute ischemic changes.    The patient's brother is at bedside.  He states that the patient is  for the past 6 years and he feels she is becoming overburdened with the stressors of being a sole home on her with keeping up with the property.    Patient was given Zofran and Ativan here with notable improvement of her symptoms from a subjective standpoint.  Given her well appearance I feel she is appropriate for discharge.  Reassurance was given.  Follow-up with her private physician and return if worse.        Procedure  Procedures     Preston Henderson, DO  09/06/24 1469

## 2024-09-07 LAB
ATRIAL RATE: 102 BPM
P AXIS: 58 DEGREES
P OFFSET: 191 MS
P ONSET: 124 MS
PR INTERVAL: 166 MS
Q ONSET: 207 MS
QRS COUNT: 17 BEATS
QRS DURATION: 128 MS
QT INTERVAL: 350 MS
QTC CALCULATION(BAZETT): 456 MS
QTC FREDERICIA: 417 MS
R AXIS: 35 DEGREES
T AXIS: 219 DEGREES
T OFFSET: 382 MS
VENTRICULAR RATE: 102 BPM